# Patient Record
Sex: MALE | Race: WHITE | URBAN - METROPOLITAN AREA
[De-identification: names, ages, dates, MRNs, and addresses within clinical notes are randomized per-mention and may not be internally consistent; named-entity substitution may affect disease eponyms.]

---

## 2018-06-25 ENCOUNTER — OFFICE VISIT (OUTPATIENT)
Dept: URBAN - METROPOLITAN AREA CLINIC 17 | Facility: CLINIC | Age: 58
End: 2018-06-25
Payer: COMMERCIAL

## 2018-06-25 DIAGNOSIS — H25.13 AGE-RELATED NUCLEAR CATARACT, BILATERAL: ICD-10-CM

## 2018-06-25 DIAGNOSIS — E11.3313 TYPE 2 DIAB W MODERATE NONPRLF DIAB RTNOP W MACULAR EDEMA, BILATERAL: Primary | ICD-10-CM

## 2018-06-25 PROCEDURE — 92014 COMPRE OPH EXAM EST PT 1/>: CPT | Performed by: OPHTHALMOLOGY

## 2018-06-25 PROCEDURE — 92134 CPTRZ OPH DX IMG PST SGM RTA: CPT | Performed by: OPHTHALMOLOGY

## 2018-06-25 ASSESSMENT — INTRAOCULAR PRESSURE
OD: 14
OS: 15

## 2018-06-25 NOTE — IMPRESSION/PLAN
Impression: Type 2 diab w moderate nonprlf diab rtnop w macular edema, bilateral: e11.3313 OU. Condition: established, improving OS unstable OD. hx of injections, last AV OU:05/11/2017
s/p MAP OS 6-13-17,s/p MAP OD 5- Plan: Discussed diagnosis in detail with patient. Discussed risks of progression. Recommend Focal Laser treatment in the RIGHT eye to help reduce the swelling and prevent a further reduction in vision. Discussed risks/benefits of laser TX. All questions answered. OCT performed today: min edema out of center Exam and OCT OS shows release of VMT, no edema. Recommend observation for now. Discussed and explained to patient he/she may need additional treatments in the future such as Intravitreal Injection and/or laser treatments.

## 2018-06-25 NOTE — IMPRESSION/PLAN
Impression: Age-related nuclear cataract, bilateral: H25.13. OU. Condition: established, stable. Plan: Cataracts account for the patient's complaints. No treatment currently recommended. The patient will monitor vision changes and contact us with any decrease in vision.

## 2018-08-14 ENCOUNTER — SURGERY (OUTPATIENT)
Dept: URBAN - METROPOLITAN AREA SURGERY 7 | Facility: SURGERY | Age: 58
End: 2018-08-14
Payer: COMMERCIAL

## 2018-08-14 PROCEDURE — 67210 TREATMENT OF RETINAL LESION: CPT | Performed by: OPHTHALMOLOGY

## 2018-08-21 ENCOUNTER — POST-OPERATIVE VISIT (OUTPATIENT)
Dept: URBAN - METROPOLITAN AREA CLINIC 17 | Facility: CLINIC | Age: 58
End: 2018-08-21

## 2018-08-21 ASSESSMENT — INTRAOCULAR PRESSURE
OD: 16
OS: 17

## 2018-10-03 ENCOUNTER — POST-OPERATIVE VISIT (OUTPATIENT)
Dept: URBAN - METROPOLITAN AREA CLINIC 17 | Facility: CLINIC | Age: 58
End: 2018-10-03
Payer: COMMERCIAL

## 2018-10-03 DIAGNOSIS — Z09 ENCNTR FOR F/U EXAM AFT TRTMT FOR COND OTH THAN MALIG NEOPLM: Primary | ICD-10-CM

## 2018-10-03 PROCEDURE — 99024 POSTOP FOLLOW-UP VISIT: CPT | Performed by: OPHTHALMOLOGY

## 2018-10-03 ASSESSMENT — INTRAOCULAR PRESSURE
OS: 19
OD: 19

## 2024-02-07 ENCOUNTER — NEW PATIENT (OUTPATIENT)
Dept: URBAN - METROPOLITAN AREA CLINIC 17 | Facility: CLINIC | Age: 64
End: 2024-02-07

## 2024-02-07 DIAGNOSIS — H43.812: ICD-10-CM

## 2024-02-07 DIAGNOSIS — Z79.4: ICD-10-CM

## 2024-02-07 DIAGNOSIS — H25.13: ICD-10-CM

## 2024-02-07 DIAGNOSIS — E11.3293: ICD-10-CM

## 2024-02-07 DIAGNOSIS — H40.1132: ICD-10-CM

## 2024-02-07 PROCEDURE — 92015 DETERMINE REFRACTIVE STATE: CPT

## 2024-02-07 PROCEDURE — 99204 OFFICE O/P NEW MOD 45 MIN: CPT

## 2024-02-07 PROCEDURE — 76514 ECHO EXAM OF EYE THICKNESS: CPT

## 2024-02-07 PROCEDURE — 92020 GONIOSCOPY: CPT

## 2024-02-07 PROCEDURE — 92133 CPTRZD OPH DX IMG PST SGM ON: CPT

## 2024-02-07 ASSESSMENT — PACHYMETRY
OD_CT_UM: 558
OS_CT_UM: 546

## 2024-02-07 ASSESSMENT — TONOMETRY
OS_IOP_MMHG: 26
OD_IOP_MMHG: 26

## 2024-02-07 ASSESSMENT — VISUAL ACUITY
OD_SC: 20/70
OS_SC: 20/70
OD_GLARE: <20/400
OS_GLARE: <20/400

## 2024-02-26 ENCOUNTER — ESTABLISHED PATIENT (OUTPATIENT)
Dept: URBAN - METROPOLITAN AREA CLINIC 17 | Facility: CLINIC | Age: 64
End: 2024-02-26

## 2024-02-26 DIAGNOSIS — H25.13: ICD-10-CM

## 2024-02-26 DIAGNOSIS — E11.3293: ICD-10-CM

## 2024-02-26 DIAGNOSIS — H43.812: ICD-10-CM

## 2024-02-26 DIAGNOSIS — Z79.4: ICD-10-CM

## 2024-02-26 DIAGNOSIS — H40.1132: ICD-10-CM

## 2024-02-26 PROCEDURE — 92136 OPHTHALMIC BIOMETRY: CPT

## 2024-02-26 PROCEDURE — 92025 CPTRIZED CORNEAL TOPOGRAPHY: CPT

## 2024-02-26 PROCEDURE — 99213 OFFICE O/P EST LOW 20 MIN: CPT

## 2024-02-26 ASSESSMENT — TONOMETRY
OD_IOP_MMHG: 13
OS_IOP_MMHG: 16

## 2024-02-26 ASSESSMENT — VISUAL ACUITY
OS_GLARE: <20/400
OD_SC: 20/70
OS_SC: 20/70
OD_GLARE: <20/400

## 2024-04-26 ENCOUNTER — POST-OP (OUTPATIENT)
Dept: URBAN - METROPOLITAN AREA CLINIC 17 | Facility: CLINIC | Age: 64
End: 2024-04-26

## 2024-04-26 DIAGNOSIS — Z96.1: ICD-10-CM

## 2024-04-26 PROCEDURE — 99024 POSTOP FOLLOW-UP VISIT: CPT

## 2024-04-26 ASSESSMENT — TONOMETRY: OS_IOP_MMHG: 15

## 2024-04-26 ASSESSMENT — VISUAL ACUITY: OS_SC: 20/30-2

## 2024-04-30 ENCOUNTER — POST OP/EVAL OF SECOND EYE (OUTPATIENT)
Dept: URBAN - METROPOLITAN AREA CLINIC 17 | Facility: CLINIC | Age: 64
End: 2024-04-30

## 2024-04-30 DIAGNOSIS — Z96.1: ICD-10-CM

## 2024-04-30 DIAGNOSIS — H25.11: ICD-10-CM

## 2024-04-30 PROCEDURE — 99024 POSTOP FOLLOW-UP VISIT: CPT

## 2024-04-30 PROCEDURE — 92136 OPHTHALMIC BIOMETRY: CPT | Mod: 26,RT

## 2024-04-30 ASSESSMENT — TONOMETRY
OD_IOP_MMHG: 15
OS_IOP_MMHG: 16

## 2024-04-30 ASSESSMENT — VISUAL ACUITY: OS_SC: 20/25-

## 2024-05-10 ENCOUNTER — POST-OP (OUTPATIENT)
Dept: URBAN - METROPOLITAN AREA CLINIC 17 | Facility: CLINIC | Age: 64
End: 2024-05-10

## 2024-05-10 DIAGNOSIS — Z96.1: ICD-10-CM

## 2024-05-10 PROCEDURE — 99024 POSTOP FOLLOW-UP VISIT: CPT

## 2024-05-10 ASSESSMENT — VISUAL ACUITY: OD_SC: 20/30-2

## 2024-05-10 ASSESSMENT — TONOMETRY: OD_IOP_MMHG: 13

## 2024-05-23 ENCOUNTER — POST-OP (OUTPATIENT)
Dept: URBAN - METROPOLITAN AREA CLINIC 17 | Facility: CLINIC | Age: 64
End: 2024-05-23

## 2024-05-23 DIAGNOSIS — Z96.1: ICD-10-CM

## 2024-05-23 PROCEDURE — 99024 POSTOP FOLLOW-UP VISIT: CPT

## 2024-05-23 ASSESSMENT — VISUAL ACUITY
OD_PH: 20/30-2
OD_SC: 20/50-1
OS_SC: 20/30-1

## 2024-05-23 ASSESSMENT — TONOMETRY
OD_IOP_MMHG: 17
OS_IOP_MMHG: 17

## 2024-07-05 ENCOUNTER — DIAGNOSTICS ONLY (OUTPATIENT)
Dept: URBAN - METROPOLITAN AREA CLINIC 17 | Facility: CLINIC | Age: 64
End: 2024-07-05

## 2024-07-05 DIAGNOSIS — H40.1132: ICD-10-CM

## 2024-07-05 PROCEDURE — 92083 EXTENDED VISUAL FIELD XM: CPT | Mod: 79

## 2024-09-13 ENCOUNTER — EMERGENCY VISIT (OUTPATIENT)
Dept: URBAN - METROPOLITAN AREA CLINIC 17 | Facility: CLINIC | Age: 64
End: 2024-09-13

## 2024-09-13 DIAGNOSIS — H40.1132: ICD-10-CM

## 2024-09-13 DIAGNOSIS — Z79.4: ICD-10-CM

## 2024-09-13 DIAGNOSIS — H18.232: ICD-10-CM

## 2024-09-13 DIAGNOSIS — E11.3293: ICD-10-CM

## 2024-09-13 PROCEDURE — 92134 CPTRZ OPH DX IMG PST SGM RTA: CPT

## 2024-09-13 PROCEDURE — 99213 OFFICE O/P EST LOW 20 MIN: CPT

## 2024-09-16 ENCOUNTER — FOLLOW UP (OUTPATIENT)
Dept: URBAN - METROPOLITAN AREA CLINIC 17 | Facility: CLINIC | Age: 64
End: 2024-09-16

## 2024-09-16 DIAGNOSIS — H40.1132: ICD-10-CM

## 2024-09-16 PROCEDURE — 99213 OFFICE O/P EST LOW 20 MIN: CPT

## 2024-09-16 RX ORDER — NETARSUDIL AND LATANOPROST OPHTHALMIC SOLUTION, 0.02%/0.005% .2; .05 MG/ML; MG/ML: 1 SOLUTION/ DROPS OPHTHALMIC; TOPICAL EVERY EVENING
